# Patient Record
Sex: MALE | Race: WHITE
[De-identification: names, ages, dates, MRNs, and addresses within clinical notes are randomized per-mention and may not be internally consistent; named-entity substitution may affect disease eponyms.]

---

## 2019-10-09 ENCOUNTER — HOSPITAL ENCOUNTER (EMERGENCY)
Dept: HOSPITAL 25 - UCEAST | Age: 54
Discharge: HOME | End: 2019-10-09
Payer: COMMERCIAL

## 2019-10-09 VITALS — DIASTOLIC BLOOD PRESSURE: 84 MMHG | SYSTOLIC BLOOD PRESSURE: 128 MMHG

## 2019-10-09 DIAGNOSIS — Z91.018: ICD-10-CM
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
## 2019-10-09 DIAGNOSIS — S62.502A: Primary | ICD-10-CM
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
## 2019-10-09 PROCEDURE — G0463 HOSPITAL OUTPT CLINIC VISIT: HCPCS

## 2019-10-09 PROCEDURE — 99211 OFF/OP EST MAY X REQ PHY/QHP: CPT

## 2019-10-09 NOTE — UC
Hand/Wrist HPI





- HPI Summary


HPI Summary: 


Patient is a 55yo male presenting with left thumb pain since yesterday when he 

states he "jammed it while using a wrench while working on his wife's car." 

Describes pain as sharp and shooting if he applies pressure to the thumb or 

bangs it on something. Notes minimal throbbing pain at rest. Notes decreased 

flexion. Denies numbness and tingling. Denies hand, wrist, or other finger 

pain. Notes bruising and swelling. Took ibuprofen and iced it last night with 

minimal relief of pain.








- History Of Current Complaint


Chief Complaint: UCUpperExtremity


Stated Complaint: THUMB INJURY


Hx Obtained From: Patient


Onset/Duration: Sudden Onset


Severity Currently: Severe


Pain Intensity: 10


Pain Scale Used: 0-10 Numeric


Related History: Dominant Hand Left





- Allergies/Home Medications


Allergies/Adverse Reactions: 


 Allergies











Allergy/AdvReac Type Severity Reaction Status Date / Time


 


nuts Allergy  anaph Uncoded 10/09/19 13:32














PMH/Surg Hx/FS Hx/Imm Hx


GI/ History: Gastroesophageal Reflux





- Surgical History


Surgical History: Yes


Surgery Procedure, Year, and Place: LSP SURGERY X 2





- Family History


Known Family History: Positive: Unknown





- Social History


Alcohol Use: Rare


Substance Use Type: None


Smoking Status (MU): Never Smoked Tobacco





Review of Systems


All Other Systems Reviewed And Are Negative: No


Constitutional: Positive: Negative


Respiratory: Positive: Negative


Cardiovascular: Positive: Negative


Motor: Positive: Negative


Neurovascular: Positive: Negative.  Negative: Decreased Sensation, Decreased 

Pulses


Musculoskeletal: Positive: Arthralgia, Decreased ROM, Edema.  Negative: Myalgia


Neurological: Negative: Paresthesia, Numbness





Physical Exam


Triage Information Reviewed: Yes


Appearance: Well-Appearing, No Pain Distress, Well-Nourished


Vital Signs: 


 Initial Vital Signs











Temp  97.9 F   10/09/19 13:27


 


Pulse  67   10/09/19 13:27


 


Resp  16   10/09/19 13:27


 


BP  128/84   10/09/19 13:27


 


Pulse Ox  100   10/09/19 13:27











Vital Signs Reviewed: Yes


Eyes: Positive: Conjunctiva Clear


ENT: Positive: Hearing grossly normal


Neck: Positive: Supple


Respiratory: Positive: No respiratory distress


Cardiovascular: Positive: Pulses Normal - strong radial pulses, Brisk Capillary 

Refill


Musculoskeletal: Positive: Strength Intact, No Edema, ROM Limited @ - flexion 

of left thumb, Other: - tenderness to palpation of left thumb MCP joint


Neurological Exam: Other - sensation grossly intact


Neurological: Positive: Alert


Psychological: Positive: Age Appropriate Behavior


Skin: Positive: Other - thenar ecchymosis noted





Diagnostics





- Radiology


  ** left thumb


Radiology Interpretation Completed By: Radiologist


Summary of Radiographic Findings: IMPRESSION: Likely small avulsion fracture 

through the dorsal plate of the distal phalanx of the interphalangeal joint of 

the left thumb.





Hand/Wrist Course/Dx





- Course


Course Of Treatment: 


Discussed possible fracture of thumb read on the xrays with patient. Instructed 

him to use rest, ice, elevation, and thumb splint for pain relief. Instructed 

him to continue ibuprofen as directed for pain relief and to follow up with 

orthopedics as soon as possible. Patient voiced understanding and agreed to the 

treatment plan.








- Differential Dx/Diagnosis


Provider Diagnosis: 


 Avulsion fracture of left thumb








Discharge ED





- Sign-Out/Discharge


Documenting (check all that apply): Patient Departure


All imaging exams completed and their final reports reviewed: Yes





- Discharge Plan


Condition: Stable


Disposition: HOME


Patient Education Materials:  Avulsion Fracture (ED)


Referrals: 


Meng Felipe MD [Primary Care Provider] - If Needed


Wing Hernandez MD [Medical Doctor] - As Soon As Possible


Additional Instructions: 


As discussed, the xrays of the thumb revealed a small fracture.


Use rest, ice, elevation, and the thumb splint to help relieve pain. Keep the 

splint on until you follow up with orthopedics.


You may also continue to use over the counter pain medications as directed for 

relief of pain.


Follow up with your orthopedics as listed below as soon as possible for further 

evaluation.


Return or go to the emergency room if pain worsens, the hand becomes cold and 

numb, or you are not able to move the thumb.








- Billing Disposition and Condition


Condition: STABLE


Disposition: Home